# Patient Record
Sex: FEMALE | Race: WHITE | NOT HISPANIC OR LATINO | Employment: OTHER | URBAN - METROPOLITAN AREA
[De-identification: names, ages, dates, MRNs, and addresses within clinical notes are randomized per-mention and may not be internally consistent; named-entity substitution may affect disease eponyms.]

---

## 2017-08-30 ENCOUNTER — OFFICE VISIT (OUTPATIENT)
Dept: FAMILY MEDICINE CLINIC | Facility: CLINIC | Age: 22
End: 2017-08-30

## 2017-08-30 VITALS
TEMPERATURE: 98.7 F | HEIGHT: 64 IN | DIASTOLIC BLOOD PRESSURE: 62 MMHG | HEART RATE: 96 BPM | OXYGEN SATURATION: 98 % | RESPIRATION RATE: 16 BRPM | SYSTOLIC BLOOD PRESSURE: 100 MMHG | WEIGHT: 112.6 LBS | BODY MASS INDEX: 19.22 KG/M2

## 2017-08-30 DIAGNOSIS — Z76.89 ENCOUNTER TO ESTABLISH CARE WITH NEW DOCTOR: ICD-10-CM

## 2017-08-30 DIAGNOSIS — F90.0 ATTENTION DEFICIT HYPERACTIVITY DISORDER (ADHD), PREDOMINANTLY INATTENTIVE TYPE: Primary | ICD-10-CM

## 2017-08-30 PROBLEM — F98.8 ADD (ATTENTION DEFICIT DISORDER): Status: ACTIVE | Noted: 2017-08-30

## 2017-08-30 LAB
ALBUMIN SERPL-MCNC: 4.6 G/DL (ref 3.2–4.8)
ALBUMIN/GLOB SERPL: 2.1 G/DL (ref 1.5–2.5)
ALP SERPL-CCNC: 64 U/L (ref 25–100)
ALT SERPL-CCNC: 26 U/L (ref 7–40)
AST SERPL-CCNC: 20 U/L (ref 0–33)
BASOPHILS # BLD AUTO: 0.01 10*3/MM3 (ref 0–0.2)
BASOPHILS NFR BLD AUTO: 0.2 % (ref 0–1)
BILIRUB SERPL-MCNC: 0.5 MG/DL (ref 0.3–1.2)
BUN SERPL-MCNC: 11 MG/DL (ref 9–23)
BUN/CREAT SERPL: 13.8 (ref 7–25)
CALCIUM SERPL-MCNC: 9.6 MG/DL (ref 8.7–10.4)
CHLORIDE SERPL-SCNC: 106 MMOL/L (ref 99–109)
CO2 SERPL-SCNC: 28 MMOL/L (ref 20–31)
CREAT SERPL-MCNC: 0.8 MG/DL (ref 0.6–1.3)
EOSINOPHIL # BLD AUTO: 0.14 10*3/MM3 (ref 0–0.3)
EOSINOPHIL NFR BLD AUTO: 2.4 % (ref 0–3)
ERYTHROCYTE [DISTWIDTH] IN BLOOD BY AUTOMATED COUNT: 12.3 % (ref 11.3–14.5)
GLOBULIN SER CALC-MCNC: 2.2 GM/DL
GLUCOSE SERPL-MCNC: 86 MG/DL (ref 70–100)
HCT VFR BLD AUTO: 40.4 % (ref 34.5–44)
HGB BLD-MCNC: 13.3 G/DL (ref 11.5–15.5)
IMM GRANULOCYTES # BLD: 0 10*3/MM3 (ref 0–0.03)
IMM GRANULOCYTES NFR BLD: 0 % (ref 0–0.6)
LYMPHOCYTES # BLD AUTO: 1.65 10*3/MM3 (ref 0.6–4.8)
LYMPHOCYTES NFR BLD AUTO: 28.7 % (ref 24–44)
MCH RBC QN AUTO: 29.9 PG (ref 27–31)
MCHC RBC AUTO-ENTMCNC: 32.9 G/DL (ref 32–36)
MCV RBC AUTO: 90.8 FL (ref 80–99)
MONOCYTES # BLD AUTO: 0.43 10*3/MM3 (ref 0–1)
MONOCYTES NFR BLD AUTO: 7.5 % (ref 0–12)
NEUTROPHILS # BLD AUTO: 3.52 10*3/MM3 (ref 1.5–8.3)
NEUTROPHILS NFR BLD AUTO: 61.2 % (ref 41–71)
PLATELET # BLD AUTO: 234 10*3/MM3 (ref 150–450)
POTASSIUM SERPL-SCNC: 4.1 MMOL/L (ref 3.5–5.5)
PROT SERPL-MCNC: 6.8 G/DL (ref 5.7–8.2)
RBC # BLD AUTO: 4.45 10*6/MM3 (ref 3.89–5.14)
SODIUM SERPL-SCNC: 140 MMOL/L (ref 132–146)
WBC # BLD AUTO: 5.75 10*3/MM3 (ref 3.5–10.8)

## 2017-08-30 PROCEDURE — 99203 OFFICE O/P NEW LOW 30 MIN: CPT | Performed by: FAMILY MEDICINE

## 2017-08-30 NOTE — PROGRESS NOTES
Subjective   Amanda Curtis is a 22 y.o. female.     History of Present Illness   Here to establish care  LMP: 8/11/17  Pap: 2013  She recently moved back to [place] from [place].  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  She has a history of ADD, currently treated with Vyvanse 50mg.   States that she is well on this dosage, but if she misses it she will have a headache. Requesting refill of medication.   She has no adverse side effects related to the treatment.    The following portions of the patient's history were reviewed and updated as appropriate: allergies, current medications, past family history, past medical history, past social history, past surgical history and problem list.    Review of Systems   Constitutional: Negative for diaphoresis, fatigue and fever.   HENT: Negative for congestion, ear pain and hearing loss.    Eyes: Negative for pain and visual disturbance.   Respiratory: Negative for cough, chest tightness and shortness of breath.    Cardiovascular: Negative for chest pain, palpitations and leg swelling.   Gastrointestinal: Negative for abdominal pain, blood in stool, constipation, diarrhea and vomiting.   Endocrine: Negative for cold intolerance, heat intolerance, polydipsia, polyphagia and polyuria.   Genitourinary: Negative for difficulty urinating and dysuria.   Musculoskeletal: Negative for back pain and gait problem.   Skin: Negative for color change, rash and wound.   Allergic/Immunologic: Negative for environmental allergies and food allergies.   Neurological: Negative for dizziness, weakness, numbness and headaches.   Hematological: Negative for adenopathy. Does not bruise/bleed easily.   Psychiatric/Behavioral: Negative for agitation, confusion and sleep disturbance.       Objective   Physical Exam   Constitutional: She is oriented to person, place, and time. She appears well-developed and well-nourished.   HENT:   Head: Normocephalic and atraumatic.   Right Ear:  Hearing, tympanic membrane, external ear and ear canal normal.   Left Ear: Hearing, tympanic membrane, external ear and ear canal normal.   Nose: Nose normal.   Mouth/Throat: Uvula is midline, oropharynx is clear and moist and mucous membranes are normal.   Eyes: Conjunctivae and EOM are normal. Pupils are equal, round, and reactive to light.   Neck: Normal range of motion. Neck supple. No tracheal deviation present. No thyromegaly present.   Cardiovascular: Normal rate, regular rhythm and normal heart sounds.    No murmur heard.  Pulmonary/Chest: Effort normal and breath sounds normal. No respiratory distress. She has no wheezes.   Abdominal: Soft. Bowel sounds are normal. She exhibits no distension. There is no tenderness.   Musculoskeletal: Normal range of motion. She exhibits no edema, tenderness or deformity.   Lymphadenopathy:     She has no cervical adenopathy.   Neurological: She is alert and oriented to person, place, and time. No cranial nerve deficit.   Skin: Skin is warm and dry. No rash noted.   Psychiatric: She has a normal mood and affect. Her behavior is normal. Judgment and thought content normal.   Nursing note and vitals reviewed.      Assessment/Plan   Amanda was seen today for establish care and med refill.    Diagnoses and all orders for this visit:    Attention deficit hyperactivity disorder (ADHD), predominantly inattentive type  -     lisdexamfetamine (VYVANSE) 50 MG capsule; Take 1 capsule by mouth Every Morning.  -     CBC & Differential  -     Comprehensive Metabolic Panel    Encounter to establish care with new doctor  -     CBC & Differential  -     Comprehensive Metabolic Panel      Initially tachycardic, however on recheck it did normalize.  She has been advised to stop Vyvanse if there are any symptoms of palpitations or chest pain.  Labs completed today.   Vyvanse refilled. CAITLYN query complete. Treatment plan to include course of prescribed controlled substance. Risks including  addiction, benefits, and alternatives presented to patient.   She has been advised to schedule pap smear, she will contact gyn.

## 2017-08-30 NOTE — PATIENT INSTRUCTIONS
Go to the nearest ER or return to clinic if symptoms worsen, fever/chill develop  Attention Deficit Hyperactivity Disorder  Attention deficit hyperactivity disorder (ADHD) is a problem with behavior issues based on the way the brain functions (neurobehavioral disorder). It is a common reason for behavior and academic problems in school.  SYMPTOMS   There are 3 types of ADHD. The 3 types and some of the symptoms include:  · Inattentive.    Gets bored or distracted easily.    Loses or forgets things. Forgets to hand in homework.    Has trouble organizing or completing tasks.    Difficulty staying on task.    An inability to organize daily tasks and school work.    Leaving projects, chores, or homework unfinished.    Trouble paying attention or responding to details. Careless mistakes.    Difficulty following directions. Often seems like is not listening.    Dislikes activities that require sustained attention (like chores or homework).  · Hyperactive-impulsive.    Feels like it is impossible to sit still or stay in a seat. Fidgeting with hands and feet.    Trouble waiting turn.    Talking too much or out of turn. Interruptive.    Speaks or acts impulsively.    Aggressive, disruptive behavior.    Constantly busy or on the go; noisy.    Often leaves seat when they are expected to remain seated.    Often runs or climbs where it is not appropriate, or feels very restless.  · Combined.    Has symptoms of both of the above.  Often children with ADHD feel discouraged about themselves and with school. They often perform well below their abilities in school.  As children get older, the excess motor activities can calm down, but the problems with paying attention and staying organized persist. Most children do not outgrow ADHD but with good treatment can learn to cope with the symptoms.  DIAGNOSIS   When ADHD is suspected, the diagnosis should be made by professionals trained in ADHD. This professional will collect information  about the individual suspected of having ADHD. Information must be collected from various settings where the person lives, works, or attends school.    Diagnosis will include:  · Confirming symptoms began in childhood.  · Ruling out other reasons for the child's behavior.  · The health care providers will check with the child's school and check their medical records.  · They will talk to teachers and parents.  · Behavior rating scales for the child will be filled out by those dealing with the child on a daily basis.  A diagnosis is made only after all information has been considered.  TREATMENT   Treatment usually includes behavioral treatment, tutoring or extra support in school, and stimulant medicines. Because of the way a person's brain works with ADHD, these medicines decrease impulsivity and hyperactivity and increase attention. This is different than how they would work in a person who does not have ADHD. Other medicines used include antidepressants and certain blood pressure medicines.  Most experts agree that treatment for ADHD should address all aspects of the person's functioning. Along with medicines, treatment should include structured classroom management at school. Parents should reward good behavior, provide constant discipline, and set limits. Tutoring should be available for the child as needed.  ADHD is a lifelong condition. If untreated, the disorder can have long-term serious effects into adolescence and adulthood.  HOME CARE INSTRUCTIONS   · Often with ADHD there is a lot of frustration among family members dealing with the condition. Blame and anger are also feelings that are common. In many cases, because the problem affects the family as a whole, the entire family may need help. A therapist can help the family find better ways to handle the disruptive behaviors of the person with ADHD and promote change. If the person with ADHD is young, most of the therapist's work is with the parents.  Parents will learn techniques for coping with and improving their child's behavior. Sometimes only the child with the ADHD needs counseling. Your health care providers can help you make these decisions.  · Children with ADHD may need help learning how to organize. Some helpful tips include:  ¨ Keep routines the same every day from wake-up time to bedtime. Schedule all activities, including homework and playtime. Keep the schedule in a place where the person with ADHD will often see it. Johnny schedule changes as far in advance as possible.  ¨ Schedule outdoor and indoor recreation.  ¨ Have a place for everything and keep everything in its place. This includes clothing, backpacks, and school supplies.  ¨ Encourage writing down assignments and bringing home needed books. Work with your child's teachers for assistance in organizing school work.  · Offer your child a well-balanced diet. Breakfast that includes a balance of whole grains, protein, and fruits or vegetables is especially important for school performance. Children should avoid drinks with caffeine including:  ¨ Soft drinks.  ¨ Coffee.  ¨ Tea.  ¨ However, some older children (adolescents) may find these drinks helpful in improving their attention. Because it can also be common for adolescents with ADHD to become addicted to caffeine, talk with your health care provider about what is a safe amount of caffeine intake for your child.  · Children with ADHD need consistent rules that they can understand and follow. If rules are followed, give small rewards. Children with ADHD often receive, and expect, criticism. Look for good behavior and praise it. Set realistic goals. Give clear instructions. Look for activities that can foster success and self-esteem. Make time for pleasant activities with your child. Give lots of affection.  · Parents are their children's greatest advocates. Learn as much as possible about ADHD. This helps you become a stronger and better  advocate for your child. It also helps you educate your child's teachers and instructors if they feel inadequate in these areas. Parent support groups are often helpful. A national group with local chapters is called Children and Adults with Attention Deficit Hyperactivity Disorder (REBEL).  SEEK MEDICAL CARE IF:  · Your child has repeated muscle twitches, cough, or speech outbursts.  · Your child has sleep problems.  · Your child has a marked loss of appetite.  · Your child develops depression.  · Your child has new or worsening behavioral problems.  · Your child develops dizziness.  · Your child has a racing heart.  · Your child has stomach pains.  · Your child develops headaches.  SEEK IMMEDIATE MEDICAL CARE IF:  · Your child has been diagnosed with depression or anxiety and the symptoms seem to be getting worse.  · Your child has been depressed and suddenly appears to have increased energy or motivation.  · You are worried that your child is having a bad reaction to a medication he or she is taking for ADHD.     This information is not intended to replace advice given to you by your health care provider. Make sure you discuss any questions you have with your health care provider.     Document Released: 12/08/2003 Document Revised: 12/23/2014 Document Reviewed: 08/25/2014  [employer] Interactive Patient Education ©2017 [employer]

## 2017-09-25 ENCOUNTER — TELEPHONE (OUTPATIENT)
Dept: FAMILY MEDICINE CLINIC | Facility: CLINIC | Age: 22
End: 2017-09-25

## 2017-09-25 DIAGNOSIS — F90.0 ATTENTION DEFICIT HYPERACTIVITY DISORDER (ADHD), PREDOMINANTLY INATTENTIVE TYPE: ICD-10-CM

## 2017-09-25 NOTE — TELEPHONE ENCOUNTER
----- Message from Chaya Sauceda sent at 9/25/2017  3:47 PM EDT -----  Contact: Lalo  Patient is needing a refill Vyvanse 50mg. Please contact patient when ready [phone].

## 2017-10-24 ENCOUNTER — TELEPHONE (OUTPATIENT)
Dept: FAMILY MEDICINE CLINIC | Facility: CLINIC | Age: 22
End: 2017-10-24

## 2017-10-24 DIAGNOSIS — F90.0 ATTENTION DEFICIT HYPERACTIVITY DISORDER (ADHD), PREDOMINANTLY INATTENTIVE TYPE: ICD-10-CM

## 2017-11-17 ENCOUNTER — TELEPHONE (OUTPATIENT)
Dept: FAMILY MEDICINE CLINIC | Facility: CLINIC | Age: 22
End: 2017-11-17

## 2017-11-17 DIAGNOSIS — F90.0 ATTENTION DEFICIT HYPERACTIVITY DISORDER (ADHD), PREDOMINANTLY INATTENTIVE TYPE: ICD-10-CM

## 2017-11-20 ENCOUNTER — TELEPHONE (OUTPATIENT)
Dept: FAMILY MEDICINE CLINIC | Facility: CLINIC | Age: 22
End: 2017-11-20

## 2017-11-20 DIAGNOSIS — F90.0 ATTENTION DEFICIT HYPERACTIVITY DISORDER (ADHD), PREDOMINANTLY INATTENTIVE TYPE: ICD-10-CM

## 2017-11-20 NOTE — TELEPHONE ENCOUNTER
----- Message from Geovanna Lloyd sent at 11/20/2017  9:42 AM EST -----  Contact: KEVIN  PATIENT CALLING BACK ABOUT HER VYVANSE BECAUSE SHE NEEDS IT TODAY AND HER EMELIA IS NOT UNTIL THE 30TH OF NOV.     VYVANSE 5O MG

## 2017-11-20 NOTE — TELEPHONE ENCOUNTER
According to Jose Rafael report, she filled 10/26/17, so she shouldn't be out yet.   I will refill, but there will be a hold date on it. JONATHON

## 2017-11-30 ENCOUNTER — TELEPHONE (OUTPATIENT)
Dept: FAMILY MEDICINE CLINIC | Facility: CLINIC | Age: 22
End: 2017-11-30

## 2017-11-30 ENCOUNTER — OFFICE VISIT (OUTPATIENT)
Dept: FAMILY MEDICINE CLINIC | Facility: CLINIC | Age: 22
End: 2017-11-30

## 2017-11-30 VITALS
WEIGHT: 115.2 LBS | RESPIRATION RATE: 20 BRPM | TEMPERATURE: 97.8 F | DIASTOLIC BLOOD PRESSURE: 60 MMHG | HEART RATE: 80 BPM | SYSTOLIC BLOOD PRESSURE: 110 MMHG | HEIGHT: 64 IN | BODY MASS INDEX: 19.67 KG/M2

## 2017-11-30 DIAGNOSIS — F90.0 ATTENTION DEFICIT HYPERACTIVITY DISORDER (ADHD), PREDOMINANTLY INATTENTIVE TYPE: Primary | ICD-10-CM

## 2017-11-30 PROCEDURE — 99213 OFFICE O/P EST LOW 20 MIN: CPT | Performed by: FAMILY MEDICINE

## 2017-11-30 RX ORDER — POLYETHYLENE GLYCOL 3350 17 G/17G
17 POWDER, FOR SOLUTION ORAL DAILY
COMMUNITY

## 2017-11-30 NOTE — PATIENT INSTRUCTIONS
Go to the nearest ER or return to clinic if symptoms worsen, fever/chill develop    Attention Deficit Hyperactivity Disorder  Attention deficit hyperactivity disorder (ADHD) is a problem with behavior issues based on the way the brain functions (neurobehavioral disorder). It is a common reason for behavior and academic problems in school.  SYMPTOMS   There are 3 types of ADHD. The 3 types and some of the symptoms include:  · Inattentive.    Gets bored or distracted easily.    Loses or forgets things. Forgets to hand in homework.    Has trouble organizing or completing tasks.    Difficulty staying on task.    An inability to organize daily tasks and school work.    Leaving projects, chores, or homework unfinished.    Trouble paying attention or responding to details. Careless mistakes.    Difficulty following directions. Often seems like is not listening.    Dislikes activities that require sustained attention (like chores or homework).  · Hyperactive-impulsive.    Feels like it is impossible to sit still or stay in a seat. Fidgeting with hands and feet.    Trouble waiting turn.    Talking too much or out of turn. Interruptive.    Speaks or acts impulsively.    Aggressive, disruptive behavior.    Constantly busy or on the go; noisy.    Often leaves seat when they are expected to remain seated.    Often runs or climbs where it is not appropriate, or feels very restless.  · Combined.    Has symptoms of both of the above.  Often children with ADHD feel discouraged about themselves and with school. They often perform well below their abilities in school.  As children get older, the excess motor activities can calm down, but the problems with paying attention and staying organized persist. Most children do not outgrow ADHD but with good treatment can learn to cope with the symptoms.  DIAGNOSIS   When ADHD is suspected, the diagnosis should be made by professionals trained in ADHD. This professional will collect  information about the individual suspected of having ADHD. Information must be collected from various settings where the person lives, works, or attends school.    Diagnosis will include:  · Confirming symptoms began in childhood.  · Ruling out other reasons for the child's behavior.  · The health care providers will check with the child's school and check their medical records.  · They will talk to teachers and parents.  · Behavior rating scales for the child will be filled out by those dealing with the child on a daily basis.  A diagnosis is made only after all information has been considered.  TREATMENT   Treatment usually includes behavioral treatment, tutoring or extra support in school, and stimulant medicines. Because of the way a person's brain works with ADHD, these medicines decrease impulsivity and hyperactivity and increase attention. This is different than how they would work in a person who does not have ADHD. Other medicines used include antidepressants and certain blood pressure medicines.  Most experts agree that treatment for ADHD should address all aspects of the person's functioning. Along with medicines, treatment should include structured classroom management at school. Parents should reward good behavior, provide constant discipline, and set limits. Tutoring should be available for the child as needed.  ADHD is a lifelong condition. If untreated, the disorder can have long-term serious effects into adolescence and adulthood.  HOME CARE INSTRUCTIONS   · Often with ADHD there is a lot of frustration among family members dealing with the condition. Blame and anger are also feelings that are common. In many cases, because the problem affects the family as a whole, the entire family may need help. A therapist can help the family find better ways to handle the disruptive behaviors of the person with ADHD and promote change. If the person with ADHD is young, most of the therapist's work is with the  parents. Parents will learn techniques for coping with and improving their child's behavior. Sometimes only the child with the ADHD needs counseling. Your health care providers can help you make these decisions.  · Children with ADHD may need help learning how to organize. Some helpful tips include:  ¨ Keep routines the same every day from wake-up time to bedtime. Schedule all activities, including homework and playtime. Keep the schedule in a place where the person with ADHD will often see it. Johnny schedule changes as far in advance as possible.  ¨ Schedule outdoor and indoor recreation.  ¨ Have a place for everything and keep everything in its place. This includes clothing, backpacks, and school supplies.  ¨ Encourage writing down assignments and bringing home needed books. Work with your child's teachers for assistance in organizing school work.  · Offer your child a well-balanced diet. Breakfast that includes a balance of whole grains, protein, and fruits or vegetables is especially important for school performance. Children should avoid drinks with caffeine including:  ¨ Soft drinks.  ¨ Coffee.  ¨ Tea.  ¨ However, some older children (adolescents) may find these drinks helpful in improving their attention. Because it can also be common for adolescents with ADHD to become addicted to caffeine, talk with your health care provider about what is a safe amount of caffeine intake for your child.  · Children with ADHD need consistent rules that they can understand and follow. If rules are followed, give small rewards. Children with ADHD often receive, and expect, criticism. Look for good behavior and praise it. Set realistic goals. Give clear instructions. Look for activities that can foster success and self-esteem. Make time for pleasant activities with your child. Give lots of affection.  · Parents are their children's greatest advocates. Learn as much as possible about ADHD. This helps you become a stronger and  better advocate for your child. It also helps you educate your child's teachers and instructors if they feel inadequate in these areas. Parent support groups are often helpful. A national group with local chapters is called Children and Adults with Attention Deficit Hyperactivity Disorder (REBEL).  SEEK MEDICAL CARE IF:  · Your child has repeated muscle twitches, cough, or speech outbursts.  · Your child has sleep problems.  · Your child has a marked loss of appetite.  · Your child develops depression.  · Your child has new or worsening behavioral problems.  · Your child develops dizziness.  · Your child has a racing heart.  · Your child has stomach pains.  · Your child develops headaches.  SEEK IMMEDIATE MEDICAL CARE IF:  · Your child has been diagnosed with depression or anxiety and the symptoms seem to be getting worse.  · Your child has been depressed and suddenly appears to have increased energy or motivation.  · You are worried that your child is having a bad reaction to a medication he or she is taking for ADHD.     This information is not intended to replace advice given to you by your health care provider. Make sure you discuss any questions you have with your health care provider.     Document Released: 12/08/2003 Document Revised: 12/23/2014 Document Reviewed: 08/25/2014  [employer] Interactive Patient Education ©2017 [employer]

## 2017-11-30 NOTE — PROGRESS NOTES
"Subjective   Amanda Curtis is a 22 y.o. female.     ADD   This is a chronic problem. The current episode started more than 1 year ago. Pertinent negatives include no abdominal pain, chest pain, headaches or numbness. Nothing aggravates the symptoms. Treatments tried: Vyvanse. The treatment provided significant relief.   States that she is feeling a \"crash\" around 2 pm. She works long hours, 12-14 hours days. She takes Vyvanse in the early morning. She has previously been treated with Adderall, didn't like her personality while taking that medication.  Appetite is good, trying to eat better. She admits to eating carb heavy diet, but trying to increase protein and decrease carbs.  Denies any side effects related to Vyvanse, tolerating well.     The following portions of the patient's history were reviewed and updated as appropriate: allergies, current medications, past family history, past medical history, past social history, past surgical history and problem list.    Review of Systems   Constitutional: Negative.  Negative for appetite change.   Respiratory: Negative for chest tightness.    Cardiovascular: Negative for chest pain and palpitations.   Gastrointestinal: Negative for abdominal pain.   Neurological: Negative for dizziness, numbness and headaches.   Hematological: Does not bruise/bleed easily.   Psychiatric/Behavioral: Positive for decreased concentration. Negative for confusion, self-injury and sleep disturbance. The patient is not nervous/anxious and is not hyperactive.        Objective   Physical Exam   Constitutional: She is oriented to person, place, and time. She appears well-developed and well-nourished.   HENT:   Head: Normocephalic and atraumatic.   Right Ear: External ear normal.   Left Ear: External ear normal.   Nose: Nose normal.   Eyes: Conjunctivae and EOM are normal.   Cardiovascular: Normal rate, regular rhythm and normal heart sounds.    Pulmonary/Chest: Effort normal and breath sounds " normal.   Musculoskeletal: She exhibits no deformity.   Neurological: She is alert and oriented to person, place, and time. No cranial nerve deficit.   Skin: Skin is warm and dry.   Psychiatric: She has a normal mood and affect. Her behavior is normal. Thought content normal.   Nursing note and vitals reviewed.      Assessment/Plan   Amanda was seen today for add.    Diagnoses and all orders for this visit:    Attention deficit hyperactivity disorder (ADHD), predominantly inattentive type  -     Discontinue: lisdexamfetamine (VYVANSE) 60 MG capsule; Take 1 capsule by mouth Every Morning.  -     lisdexamfetamine (VYVANSE) 60 MG capsule; Take 1 capsule by mouth Every Morning.      Will increase the next dose of Vyvanse to 60mg daily and follow up shortly after. The crash she is also describing could be diet related. Advised her to consume complex carbohydrates and increased protein, avoid diet heavy in simple sugars.   CAITLYN query complete. Treatment plan to include course of prescribed controlled substance. Risks including addiction, benefits, and alternatives presented to patient.

## 2017-12-04 NOTE — TELEPHONE ENCOUNTER
An additional 10mg per day rx printed. Not sure if insurance will cover this prescription since she has already picked up 50mg daily. JONATHON

## 2018-01-11 ENCOUNTER — OFFICE VISIT (OUTPATIENT)
Dept: FAMILY MEDICINE CLINIC | Facility: CLINIC | Age: 23
End: 2018-01-11

## 2018-01-11 VITALS
WEIGHT: 112.5 LBS | RESPIRATION RATE: 18 BRPM | SYSTOLIC BLOOD PRESSURE: 112 MMHG | HEIGHT: 64 IN | TEMPERATURE: 98.3 F | BODY MASS INDEX: 19.21 KG/M2 | DIASTOLIC BLOOD PRESSURE: 70 MMHG | HEART RATE: 76 BPM

## 2018-01-11 DIAGNOSIS — F90.0 ATTENTION DEFICIT HYPERACTIVITY DISORDER (ADHD), PREDOMINANTLY INATTENTIVE TYPE: ICD-10-CM

## 2018-01-11 PROCEDURE — 99213 OFFICE O/P EST LOW 20 MIN: CPT | Performed by: FAMILY MEDICINE

## 2018-01-11 NOTE — PATIENT INSTRUCTIONS
Go to the nearest ER or return to clinic if symptoms worsen, fever/chill develop    Attention Deficit Hyperactivity Disorder  Attention deficit hyperactivity disorder (ADHD) is a problem with behavior issues based on the way the brain functions (neurobehavioral disorder). It is a common reason for behavior and academic problems in school.  SYMPTOMS   There are 3 types of ADHD. The 3 types and some of the symptoms include:  · Inattentive.    Gets bored or distracted easily.    Loses or forgets things. Forgets to hand in homework.    Has trouble organizing or completing tasks.    Difficulty staying on task.    An inability to organize daily tasks and school work.    Leaving projects, chores, or homework unfinished.    Trouble paying attention or responding to details. Careless mistakes.    Difficulty following directions. Often seems like is not listening.    Dislikes activities that require sustained attention (like chores or homework).  · Hyperactive-impulsive.    Feels like it is impossible to sit still or stay in a seat. Fidgeting with hands and feet.    Trouble waiting turn.    Talking too much or out of turn. Interruptive.    Speaks or acts impulsively.    Aggressive, disruptive behavior.    Constantly busy or on the go; noisy.    Often leaves seat when they are expected to remain seated.    Often runs or climbs where it is not appropriate, or feels very restless.  · Combined.    Has symptoms of both of the above.  Often children with ADHD feel discouraged about themselves and with school. They often perform well below their abilities in school.  As children get older, the excess motor activities can calm down, but the problems with paying attention and staying organized persist. Most children do not outgrow ADHD but with good treatment can learn to cope with the symptoms.  DIAGNOSIS   When ADHD is suspected, the diagnosis should be made by professionals trained in ADHD. This professional will collect  information about the individual suspected of having ADHD. Information must be collected from various settings where the person lives, works, or attends school.    Diagnosis will include:  · Confirming symptoms began in childhood.  · Ruling out other reasons for the child's behavior.  · The health care providers will check with the child's school and check their medical records.  · They will talk to teachers and parents.  · Behavior rating scales for the child will be filled out by those dealing with the child on a daily basis.  A diagnosis is made only after all information has been considered.  TREATMENT   Treatment usually includes behavioral treatment, tutoring or extra support in school, and stimulant medicines. Because of the way a person's brain works with ADHD, these medicines decrease impulsivity and hyperactivity and increase attention. This is different than how they would work in a person who does not have ADHD. Other medicines used include antidepressants and certain blood pressure medicines.  Most experts agree that treatment for ADHD should address all aspects of the person's functioning. Along with medicines, treatment should include structured classroom management at school. Parents should reward good behavior, provide constant discipline, and set limits. Tutoring should be available for the child as needed.  ADHD is a lifelong condition. If untreated, the disorder can have long-term serious effects into adolescence and adulthood.  HOME CARE INSTRUCTIONS   · Often with ADHD there is a lot of frustration among family members dealing with the condition. Blame and anger are also feelings that are common. In many cases, because the problem affects the family as a whole, the entire family may need help. A therapist can help the family find better ways to handle the disruptive behaviors of the person with ADHD and promote change. If the person with ADHD is young, most of the therapist's work is with the  parents. Parents will learn techniques for coping with and improving their child's behavior. Sometimes only the child with the ADHD needs counseling. Your health care providers can help you make these decisions.  · Children with ADHD may need help learning how to organize. Some helpful tips include:  ¨ Keep routines the same every day from wake-up time to bedtime. Schedule all activities, including homework and playtime. Keep the schedule in a place where the person with ADHD will often see it. Johnny schedule changes as far in advance as possible.  ¨ Schedule outdoor and indoor recreation.  ¨ Have a place for everything and keep everything in its place. This includes clothing, backpacks, and school supplies.  ¨ Encourage writing down assignments and bringing home needed books. Work with your child's teachers for assistance in organizing school work.  · Offer your child a well-balanced diet. Breakfast that includes a balance of whole grains, protein, and fruits or vegetables is especially important for school performance. Children should avoid drinks with caffeine including:  ¨ Soft drinks.  ¨ Coffee.  ¨ Tea.  ¨ However, some older children (adolescents) may find these drinks helpful in improving their attention. Because it can also be common for adolescents with ADHD to become addicted to caffeine, talk with your health care provider about what is a safe amount of caffeine intake for your child.  · Children with ADHD need consistent rules that they can understand and follow. If rules are followed, give small rewards. Children with ADHD often receive, and expect, criticism. Look for good behavior and praise it. Set realistic goals. Give clear instructions. Look for activities that can foster success and self-esteem. Make time for pleasant activities with your child. Give lots of affection.  · Parents are their children's greatest advocates. Learn as much as possible about ADHD. This helps you become a stronger and  better advocate for your child. It also helps you educate your child's teachers and instructors if they feel inadequate in these areas. Parent support groups are often helpful. A national group with local chapters is called Children and Adults with Attention Deficit Hyperactivity Disorder (REBEL).  SEEK MEDICAL CARE IF:  · Your child has repeated muscle twitches, cough, or speech outbursts.  · Your child has sleep problems.  · Your child has a marked loss of appetite.  · Your child develops depression.  · Your child has new or worsening behavioral problems.  · Your child develops dizziness.  · Your child has a racing heart.  · Your child has stomach pains.  · Your child develops headaches.  SEEK IMMEDIATE MEDICAL CARE IF:  · Your child has been diagnosed with depression or anxiety and the symptoms seem to be getting worse.  · Your child has been depressed and suddenly appears to have increased energy or motivation.  · You are worried that your child is having a bad reaction to a medication he or she is taking for ADHD.     This information is not intended to replace advice given to you by your health care provider. Make sure you discuss any questions you have with your health care provider.     Document Released: 12/08/2003 Document Revised: 12/23/2014 Document Reviewed: 08/25/2014  [employer] Interactive Patient Education ©2017 [employer]

## 2018-01-11 NOTE — PROGRESS NOTES
Subjective   Amanda Curtis is a 22 y.o. female.     History of Present Illness   Last visit Vyvanse dose was increased to 60mg daily due to losing effects throughout the day at a lower dose.   She has noticed improvement of ADHD without any side effects of increasing the dose. Denies chest pain, palpitations, insomnia, headaches. Appetite is good.     She reports that she has scheduled a routine pap smear.     The following portions of the patient's history were reviewed and updated as appropriate: allergies, current medications, past family history, past medical history, past social history, past surgical history and problem list.    Review of Systems   Constitutional: Negative.    Respiratory: Negative.    Cardiovascular: Negative.  Negative for palpitations.   Neurological: Negative for headaches.   Psychiatric/Behavioral: Negative for agitation, confusion, decreased concentration and sleep disturbance.       Objective   Physical Exam   Constitutional: She is oriented to person, place, and time. She appears well-developed and well-nourished.   HENT:   Head: Normocephalic and atraumatic.   Right Ear: External ear normal.   Left Ear: External ear normal.   Nose: Nose normal.   Eyes: Conjunctivae are normal.   Cardiovascular: Normal rate, regular rhythm and normal heart sounds.    No murmur heard.  Pulmonary/Chest: Effort normal and breath sounds normal.   Musculoskeletal: She exhibits no edema or deformity.   Neurological: She is alert and oriented to person, place, and time.   Skin: Skin is warm and dry.   Psychiatric: She has a normal mood and affect. Her behavior is normal. Judgment and thought content normal.   Nursing note and vitals reviewed.      Assessment/Plan   Amanda was seen today for adhd.    Diagnoses and all orders for this visit:    Body mass index (BMI) of 19.0-19.9 in adult    Attention deficit hyperactivity disorder (ADHD), predominantly inattentive type  -     lisdexamfetamine (VYVANSE) 60 MG  capsule; Take 1 capsule by mouth Every Morning      She is doing much better since the Vyvanse dosage increase to 60mg daily.   Rx given to patient today, she will hold until due for refill.   Follow up in 6 months

## 2018-02-20 ENCOUNTER — TELEPHONE (OUTPATIENT)
Dept: FAMILY MEDICINE CLINIC | Facility: CLINIC | Age: 23
End: 2018-02-20

## 2018-02-20 DIAGNOSIS — F90.0 ATTENTION DEFICIT HYPERACTIVITY DISORDER (ADHD), PREDOMINANTLY INATTENTIVE TYPE: ICD-10-CM

## 2018-02-20 NOTE — TELEPHONE ENCOUNTER
----- Message from Geovanna Lloyd sent at 2/20/2018  9:50 AM EST -----  Contact: KEVIN  PATIENT REQUESTING A REFILL:      VAVSUDHIR 60 MG

## 2018-03-20 ENCOUNTER — TELEPHONE (OUTPATIENT)
Dept: FAMILY MEDICINE CLINIC | Facility: CLINIC | Age: 23
End: 2018-03-20

## 2018-03-20 DIAGNOSIS — F90.0 ATTENTION DEFICIT HYPERACTIVITY DISORDER (ADHD), PREDOMINANTLY INATTENTIVE TYPE: ICD-10-CM

## 2018-03-20 NOTE — TELEPHONE ENCOUNTER
----- Message from Geovanna Lloyd sent at 3/20/2018 11:36 AM EDT -----  Contact: KEVIN  PATIENT REQUESTING A REFILL:    VYVANSE 60 MG

## 2018-04-16 ENCOUNTER — TELEPHONE (OUTPATIENT)
Dept: FAMILY MEDICINE CLINIC | Facility: CLINIC | Age: 23
End: 2018-04-16

## 2018-04-16 DIAGNOSIS — F90.0 ATTENTION DEFICIT HYPERACTIVITY DISORDER (ADHD), PREDOMINANTLY INATTENTIVE TYPE: ICD-10-CM

## 2018-04-16 NOTE — TELEPHONE ENCOUNTER
----- Message from Yana Junior sent at 4/16/2018  2:02 PM EDT -----  Contact: KEVIN ; MED REFILL   MED REFILL   lisdexamfetamine (VYVANSE) 60 MG capsule     IN OFFICE

## 2018-05-16 ENCOUNTER — TELEPHONE (OUTPATIENT)
Dept: FAMILY MEDICINE CLINIC | Facility: CLINIC | Age: 23
End: 2018-05-16

## 2018-05-16 DIAGNOSIS — F90.0 ATTENTION DEFICIT HYPERACTIVITY DISORDER (ADHD), PREDOMINANTLY INATTENTIVE TYPE: ICD-10-CM

## 2018-06-14 ENCOUNTER — TELEPHONE (OUTPATIENT)
Dept: FAMILY MEDICINE CLINIC | Facility: CLINIC | Age: 23
End: 2018-06-14

## 2018-06-14 DIAGNOSIS — F90.0 ATTENTION DEFICIT HYPERACTIVITY DISORDER (ADHD), PREDOMINANTLY INATTENTIVE TYPE: ICD-10-CM

## 2018-07-11 ENCOUNTER — OFFICE VISIT (OUTPATIENT)
Dept: FAMILY MEDICINE CLINIC | Facility: CLINIC | Age: 23
End: 2018-07-11

## 2018-07-11 VITALS
SYSTOLIC BLOOD PRESSURE: 102 MMHG | DIASTOLIC BLOOD PRESSURE: 62 MMHG | RESPIRATION RATE: 20 BRPM | TEMPERATURE: 98 F | WEIGHT: 104.8 LBS | HEIGHT: 64 IN | BODY MASS INDEX: 17.89 KG/M2 | HEART RATE: 100 BPM

## 2018-07-11 DIAGNOSIS — F90.0 ATTENTION DEFICIT HYPERACTIVITY DISORDER (ADHD), PREDOMINANTLY INATTENTIVE TYPE: Primary | ICD-10-CM

## 2018-07-11 PROCEDURE — 99213 OFFICE O/P EST LOW 20 MIN: CPT | Performed by: FAMILY MEDICINE

## 2018-07-11 NOTE — PATIENT INSTRUCTIONS
Go to the nearest ER or return to clinic if symptoms worsen, fever/chill develop      Attention Deficit Hyperactivity Disorder, Pediatric  Attention deficit hyperactivity disorder (ADHD) is a condition that can make it hard for a child to pay attention and concentrate or to control his or her behavior. The child may also have a lot of energy. ADHD is a disorder of the brain (neurodevelopmental disorder), and symptoms are typically first seen in early childhood. It is a common reason for behavioral and academic problems in school.  There are three main types of ADHD:  · Inattentive. With this type, children have difficulty paying attention.  · Hyperactive-impulsive. With this type, children have a lot of energy and have difficulty controlling their behavior.  · Combination. This type involves having symptoms of both of the other types.    ADHD is a lifelong condition. If it is not treated, the disorder can affect a child's future academic achievement, employment, and relationships.  What are the causes?  The exact cause of this condition is not known.  What increases the risk?  This condition is more likely to develop in:  · Children who have a first-degree relative, such as a parent or brother or sister, with the condition.  · Children who had a low birth weight.  · Children whose mothers had problems during pregnancy or used alcohol or tobacco during pregnancy.  · Children who have had a brain infection or a head injury.  · Children who have been exposed to lead.    What are the signs or symptoms?  Symptoms of this condition depend on the type of ADHD. Symptoms are listed here for each type:  Inattentive  · Problems with organization.  · Difficulty staying focused.  · Problems completing assignments at school.  · Often making simple mistakes.  · Problems sustaining mental effort.  · Not listening to instructions.  · Losing things often.  · Forgetting things often.  · Being easily  "distracted.  Hyperactive-impulsive  · Fidgeting often.  · Difficulty sitting still in one's seat.  · Talking a lot.  · Talking out of turn.  · Interrupting others.  · Difficulty relaxing or doing quiet activities.  · High energy levels and constant movement.  · Difficulty waiting.  · Always \"on the go.\"  Combination  · Having symptoms of both of the other types.  Children with ADHD may feel frustrated with themselves and may find school to be particularly discouraging. They often perform below their abilities in school.  As children get older, the excess movement can lessen, but the problems with paying attention and staying organized often continue. Most children do not outgrow ADHD, but with good treatment, they can learn to cope with the symptoms.  How is this diagnosed?  This condition is diagnosed based on a child's symptoms and academic history. The child's health care provider will do a complete assessment. As part of the assessment, the health care provider will ask the child questions and will ask the parents and teachers for their observations of the child. The health care provider looks for specific symptoms of ADHD.  Diagnosis will include:  · Ruling out other reasons for the child's behavior.  · Reviewing behavior rating scales that have been filled out about the child by people who deal with the child on a daily basis.    A diagnosis is made only after all information from multiple people has been considered.  How is this treated?  Treatment for this condition may include:  · Behavior therapy.  · Medicines to decrease impulsivity and hyperactivity and to increase attention. Behavior therapy is preferred for children younger than 6 years old. The combination of medicine and behavior therapy is most effective for children older than 6 years of age.  · Tutoring or extra support at school.  · Techniques for parents to use at home to help manage their child's symptoms and behavior.    Follow these " instructions at home:  Eating and drinking  · Offer your child a well-balanced diet. Breakfast that includes a balance of whole grains, protein, and fruits or vegetables is especially important for school performance.  · If your child has trouble with hyperactivity, have your child avoid drinks that contain caffeine. These include:  ? Soft drinks.  ? Coffee.  ? Tea.  · If your child is older and finds that caffeinated drinks help to improve his or her attention, talk with your child's health care provider about what amount of caffeine intake is a safe for your child.  Lifestyle    · Make sure your child gets a full night of sleep and regular daily exercise.  · Help manage your child's behavior by following the techniques learned in therapy. These may include:  ? Looking for good behavior and rewarding it.  ? Making rules for behavior that your child can understand and follow.  ? Giving clear instructions.  ? Responding consistently to your child's challenging behaviors.  ? Setting realistic goals.  ? Looking for activities that can lead to success and self-esteem.  ? Making time for pleasant activities with your child.  ? Giving lots of affection.  · Help your child learn to be organized. Some ways to do this include:  ? Keeping daily schedules the same. Have a regular wake-up time and bedtime for your child. Schedule all activities, including time for homework and time for play. Post the schedule in a place where your child will see it. Johnny schedule changes in advance.  ? Having a regular place for your child to store items such as clothing, backpacks, and school supplies.  ? Encouraging your child to write down school assignments and to bring home needed books. Work with your child's teachers for assistance in organizing school work.  General instructions  · Learn as much as you can about ADHD. This will improve your ability to help your child and to make sure he or she gets the support needed. It will also help  you educate your child's teachers and instructors if they do not feel that they have adequate knowledge or experience in these areas.  · Work with your child's teachers to make sure your child gets the support and extra help that is needed. This may include:  ? Tutoring.  ? Teacher cues to help your child remain on task.  ? Seating changes so your child is working at a desk that is free from distractions.  · Give over-the-counter and prescription medicines only as told by your child's health care provider.  · Keep all follow-up visits as told by your health care provider. This is important.  Contact a health care provider if:  · Your child has repeated muscle twitches (tics), coughs, or speech outbursts.  · Your child has sleep problems.  · Your child has a marked loss of appetite.  · Your child develops depression.  · Your child has new or worsening behavioral problems.  · Your child has dizziness.  · Your child has a racing heart.  · Your child has stomach pains.  · Your child develops headaches.  Get help right away if:  · Your child talks about or threatens suicide.  · You are worried that your child is having a bad reaction to a medicine that he or she is taking for ADHD.  This information is not intended to replace advice given to you by your health care provider. Make sure you discuss any questions you have with your health care provider.  Document Released: 12/08/2003 Document Revised: 08/16/2017 Document Reviewed: 07/13/2017  [employer] Interactive Patient Education © 2018 [employer] Inc.

## 2018-07-11 NOTE — PROGRESS NOTES
Subjective   Amanda Curtis is a 22 y.o. female.   Chief Complaint   Patient presents with   • ADD     6 mo f/u     History of Present Illness   ADHD is chronic condition, treated with Vyvanse.   She is tolerating treatment well, Vyvanse 60 mg daily is still doing good.   Denies chest pain, palpitations, insomnia, headaches. Appetite is good.     The following portions of the patient's history were reviewed and updated as appropriate: allergies, current medications, past family history, past medical history, past social history, past surgical history and problem list.    Review of Systems   Constitutional: Negative for chills, fatigue and fever.   Respiratory: Negative for cough, shortness of breath and wheezing.    Cardiovascular: Negative for chest pain, palpitations and leg swelling.   Gastrointestinal: Negative for abdominal pain.   Endocrine: Negative for cold intolerance and heat intolerance.   Musculoskeletal: Negative for gait problem.   Skin: Negative for rash.   Neurological: Negative for weakness, headache and confusion.   Hematological: Negative for adenopathy. Does not bruise/bleed easily.   Psychiatric/Behavioral: Negative for agitation, self-injury, suicidal ideas and depressed mood. The patient is not nervous/anxious.        Objective   Physical Exam   Constitutional: She is oriented to person, place, and time. She appears well-developed and well-nourished.   HENT:   Head: Normocephalic and atraumatic.   Right Ear: External ear normal.   Left Ear: External ear normal.   Nose: Nose normal.   Eyes: Conjunctivae are normal.   Neck: Normal range of motion. Neck supple.   Cardiovascular: Normal rate, regular rhythm and normal heart sounds.    No murmur heard.  Pulmonary/Chest: Effort normal and breath sounds normal.   Musculoskeletal: She exhibits no deformity.   Neurological: She is alert and oriented to person, place, and time.   Skin: Skin is warm and dry.   Psychiatric: Her behavior is normal.   Nursing  note and vitals reviewed.        Assessment/Plan   Amanda was seen today for add.    Diagnoses and all orders for this visit:    Attention deficit hyperactivity disorder (ADHD), predominantly inattentive type  -     lisdexamfetamine (VYVANSE) 60 MG capsule; Take 1 capsule by mouth Every Morning    ADHD is stable with current treatment, no changes  CAITLYN query unsuccessful secondary to prolonged retrieval time/inoperable CAITLYN system.

## 2018-07-13 ENCOUNTER — TELEPHONE (OUTPATIENT)
Dept: FAMILY MEDICINE CLINIC | Facility: CLINIC | Age: 23
End: 2018-07-13

## 2018-07-13 NOTE — TELEPHONE ENCOUNTER
----- Message from Yana Junior sent at 7/13/2018  9:07 AM EDT -----  Contact: KEVIN; MED REFILL   MED REFILL   lisdexamfetamine (VYVANSE) 60 MG capsule       PHARM ON FILE     CALL BACK   [phone]

## 2018-08-10 ENCOUNTER — TELEPHONE (OUTPATIENT)
Dept: FAMILY MEDICINE CLINIC | Facility: CLINIC | Age: 23
End: 2018-08-10

## 2018-08-10 DIAGNOSIS — F90.0 ATTENTION DEFICIT HYPERACTIVITY DISORDER (ADHD), PREDOMINANTLY INATTENTIVE TYPE: ICD-10-CM

## 2018-08-10 NOTE — TELEPHONE ENCOUNTER
----- Message from Geovanna Lloyd sent at 8/10/2018 11:21 AM EDT -----  Contact: KEVIN  PATIENT REQUESTING A REFILL:    VYVANSE 60 MG  1 A DAY    PHARMACY:  [facility]

## 2018-09-10 DIAGNOSIS — F90.0 ATTENTION DEFICIT HYPERACTIVITY DISORDER (ADHD), PREDOMINANTLY INATTENTIVE TYPE: ICD-10-CM

## 2018-09-10 NOTE — TELEPHONE ENCOUNTER
----- Message from Yana Junior sent at 9/10/2018  1:50 PM EDT -----  Contact: KEVIN; MED REFILL   MED REFILL     lisdexamfetamine (VYVANSE) 60 MG capsule Take 1 capsule by mouth Every Morning       PHARMACY -     [facility]  PT IS COMPLETELY OUT

## 2018-09-11 NOTE — TELEPHONE ENCOUNTER
LVM informing patient to  written script here at office. Left note on script that patient will need UDS when she arrives.

## 2018-10-10 ENCOUNTER — TELEPHONE (OUTPATIENT)
Dept: FAMILY MEDICINE CLINIC | Facility: CLINIC | Age: 23
End: 2018-10-10

## 2018-10-10 DIAGNOSIS — F90.0 ATTENTION DEFICIT HYPERACTIVITY DISORDER (ADHD), PREDOMINANTLY INATTENTIVE TYPE: ICD-10-CM

## 2018-11-08 ENCOUNTER — TELEPHONE (OUTPATIENT)
Dept: FAMILY MEDICINE CLINIC | Facility: CLINIC | Age: 23
End: 2018-11-08

## 2018-11-08 DIAGNOSIS — F90.0 ATTENTION DEFICIT HYPERACTIVITY DISORDER (ADHD), PREDOMINANTLY INATTENTIVE TYPE: ICD-10-CM

## 2018-11-08 NOTE — TELEPHONE ENCOUNTER
----- Message from Meredith Pisano sent at 11/8/2018  1:57 PM EST -----  Contact: moisés, mother  Refill Vyvanse 60mg taken 1x a day[facility] [phone] may leave a message

## 2018-12-07 ENCOUNTER — TELEPHONE (OUTPATIENT)
Dept: FAMILY MEDICINE CLINIC | Facility: CLINIC | Age: 23
End: 2018-12-07

## 2018-12-07 DIAGNOSIS — F90.0 ATTENTION DEFICIT HYPERACTIVITY DISORDER (ADHD), PREDOMINANTLY INATTENTIVE TYPE: ICD-10-CM

## 2019-01-03 ENCOUNTER — TELEPHONE (OUTPATIENT)
Dept: FAMILY MEDICINE CLINIC | Facility: CLINIC | Age: 24
End: 2019-01-03

## 2019-01-03 DIAGNOSIS — F90.0 ATTENTION DEFICIT HYPERACTIVITY DISORDER (ADHD), PREDOMINANTLY INATTENTIVE TYPE: ICD-10-CM

## 2019-01-03 NOTE — TELEPHONE ENCOUNTER
----- Message from Krista Alicia sent at 1/3/2019 10:29 AM EST -----  Contact: DR BROWN MED REFILL  MED REFILL ON lisdexamfetamine (VYVANSE) 60 MG capsule TO [facility].  [phone]

## 2019-01-09 ENCOUNTER — OFFICE VISIT (OUTPATIENT)
Dept: FAMILY MEDICINE CLINIC | Facility: CLINIC | Age: 24
End: 2019-01-09

## 2019-01-09 VITALS
BODY MASS INDEX: 17.84 KG/M2 | RESPIRATION RATE: 16 BRPM | DIASTOLIC BLOOD PRESSURE: 60 MMHG | HEART RATE: 96 BPM | HEIGHT: 64 IN | WEIGHT: 104.5 LBS | TEMPERATURE: 97.1 F | SYSTOLIC BLOOD PRESSURE: 108 MMHG

## 2019-01-09 DIAGNOSIS — F90.0 ATTENTION DEFICIT HYPERACTIVITY DISORDER (ADHD), PREDOMINANTLY INATTENTIVE TYPE: Primary | ICD-10-CM

## 2019-01-09 PROCEDURE — 99213 OFFICE O/P EST LOW 20 MIN: CPT | Performed by: FAMILY MEDICINE

## 2019-01-09 NOTE — PATIENT INSTRUCTIONS
Go to the nearest ER or return to clinic if symptoms worsen, fever/chill develop    Lisdexamfetamine Oral Capsule  What is this medicine?  LISDEXAMFETAMINE (lis DEX am fet a meen) is used to treat attention-deficit hyperactivity disorder (ADHD) in adults and children. It is also used to treat binge-eating disorder in adults. Federal law prohibits giving this medicine to any person other than the person for whom it was prescribed. Do not share this medicine with anyone else.  This medicine may be used for other purposes; ask your health care provider or pharmacist if you have questions.  COMMON BRAND NAME(S): Vyvanse  What should I tell my health care provider before I take this medicine?  They need to know if you have any of these conditions:  -anxiety or panic attacks  -circulation problems in fingers and toes  -glaucoma  -hardening or blockages of the arteries or heart blood vessels  -heart disease or a heart defect  -high blood pressure  -history of a drug or alcohol abuse problem  -history of stroke  -kidney disease  -liver disease  -mental illness  -seizures  -suicidal thoughts, plans, or attempt; a previous suicide attempt by you or a family member  -thyroid disease  -Tourette's syndrome  -an unusual or allergic reaction to lisdexamfetamine, other medicines, foods, dyes, or preservatives  -pregnant or trying to get pregnant  -breast-feeding  How should I use this medicine?  Take this medicine by mouth. Follow the directions on the prescription label. Swallow the capsules with a drink of water. You may open capsule and add to a glass of water, then drink right away. Take your doses at regular intervals. Do not take your medicine more often than directed. Do not suddenly stop your medicine. You must gradually reduce the dose or you may feel withdrawal effects. Ask your doctor or health care professional for advice.  A special MedGuide will be given to you by the pharmacist with each prescription and refill. Be  sure to read this information carefully each time.  Talk to your pediatrician regarding the use of this medicine in children. While this drug may be prescribed for children as young as 6 years of age for selected conditions, precautions do apply.  Overdosage: If you think you have taken too much of this medicine contact a poison control center or emergency room at once.  NOTE: This medicine is only for you. Do not share this medicine with others.  What if I miss a dose?  If you miss a dose, take it as soon as you can. If it is almost time for your next dose, take only that dose. Do not take double or extra doses.  What may interact with this medicine?  Do not take this medicine with any of the following medications:  -MAOIs like Carbex, Eldepryl, Marplan, Nardil, and Parnate  -other stimulant medicines for attention disorders, weight loss, or to stay awake  This medicine may also interact with the following medications:  -acetazolamide  -ammonium chloride  -antacids  -ascorbic acid  -atomoxetine  -caffeine  -certain medicines for blood pressure  -certain medicines for depression, anxiety, or psychotic disturbances  -certain medicines for seizures like carbamazepine, phenobarbital, phenytoin  -certain medicines for stomach problems like cimetidine, famotidine, omeprazole, lansoprazole  -cold or allergy medicines  -green tea  -levodopa  -linezolid  -medicines for sleep during surgery  -methenamine  -norepinephrine  -phenothiazines like chlorpromazine, mesoridazine, prochlorperazine, thioridazine  -propoxyphene  -sodium acid phosphate  -sodium bicarbonate  This list may not describe all possible interactions. Give your health care provider a list of all the medicines, herbs, non-prescription drugs, or dietary supplements you use. Also tell them if you smoke, drink alcohol, or use illegal drugs. Some items may interact with your medicine.  What should I watch for while using this medicine?  Visit your doctor for regular  check ups. This prescription requires that you follow special procedures with your doctor and pharmacy. You will need to have a new written prescription from your doctor every time you need a refill.  This medicine may affect your concentration, or hide signs of tiredness. Until you know how this medicine affects you, do not drive, ride a bicycle, use machinery, or do anything that needs mental alertness.  Tell your doctor or health care professional if this medicine loses its effects, or if you feel you need to take more than the prescribed amount. Do not change your dose without talking to your doctor or health care professional.  Decreased appetite is a common side effect when starting this medicine. Eating small, frequent meals or snacks can help. Talk to your doctor if you continue to have poor eating habits. Height and weight growth of a child taking this medicine will be monitored closely.  Do not take this medicine close to bedtime. It may prevent you from sleeping.  If you are going to need surgery, a MRI, CT scan, or other procedure, tell your doctor that you are taking this medicine. You may need to stop taking this medicine before the procedure.  Tell your doctor or healthcare professional right away if you notice unexplained wounds on your fingers and toes while taking this medicine. You should also tell your healthcare provider if you experience numbness or pain, changes in the skin color, or sensitivity to temperature in your fingers or toes.  What side effects may I notice from receiving this medicine?  Side effects that you should report to your doctor or health care professional as soon as possible:  -allergic reactions like skin rash, itching or hives, swelling of the face, lips, or tongue  -changes in vision  -chest pain or chest tightness  -confusion, trouble speaking or understanding  -fast, irregular heartbeat  -fingers or toes feel numb, cool, painful  -hallucination, loss of contact with  reality  -high blood pressure  -males: prolonged or painful erection  -seizures  -severe headaches  -shortness of breath  -suicidal thoughts or other mood changes  -trouble walking, dizziness, loss of balance or coordination  -uncontrollable head, mouth, neck, arm, or leg movements  Side effects that usually do not require medical attention (report to your doctor or health care professional if they continue or are bothersome):  -anxious  -headache  -loss of appetite  -nausea, vomiting  -trouble sleeping  -weight loss  This list may not describe all possible side effects. Call your doctor for medical advice about side effects. You may report side effects to FDA at 8-336-FDA-6470.  Where should I keep my medicine?  Keep out of the reach of children. This medicine can be abused. Keep your medicine in a safe place to protect it from theft. Do not share this medicine with anyone. Selling or giving away this medicine is dangerous and against the law.  Store at room temperature between 15 and 30 degrees C (59 and 86 degrees F). Protect from light. Keep container tightly closed. Throw away any unused medicine after the expiration date.  NOTE: This sheet is a summary. It may not cover all possible information. If you have questions about this medicine, talk to your doctor, pharmacist, or health care provider.  © 2018 Elsevier/Gold Standard (2015-10-21 19:20:14)

## 2019-01-09 NOTE — PROGRESS NOTES
Subjective   Amanda Curtis is a 23 y.o. female.   Chief Complaint   Patient presents with   • Follow-up   • ADD     History of Present Illness   ADHD  Chronic condition, treated with Vyvanse 60 mg daily.  Appetite is suppressed, but she does make herself eat throughout the day. Typically, eats a protein bar daily to help with this.   Denies palpitations, chest pain, sleep disturbance.    The following portions of the patient's history were reviewed and updated as appropriate: allergies, current medications, past family history, past medical history, past social history, past surgical history and problem list.    Review of Systems   Constitutional: Negative for activity change, fatigue and unexpected weight loss.   Respiratory: Negative for cough and shortness of breath.    Cardiovascular: Negative for chest pain and palpitations.   Gastrointestinal: Negative for abdominal pain.   Skin: Negative.    Neurological: Negative for headache and confusion.   Psychiatric/Behavioral: Negative for agitation, suicidal ideas and negative for hyperactivity. The patient is not nervous/anxious.        Objective   Physical Exam   Constitutional: She is oriented to person, place, and time. She appears well-developed and well-nourished.   HENT:   Head: Normocephalic and atraumatic.   Right Ear: External ear normal.   Left Ear: External ear normal.   Nose: Nose normal.   Eyes: Conjunctivae are normal.   Neck: Neck supple.   Cardiovascular: Normal rate, regular rhythm and normal heart sounds.   No murmur heard.  Pulmonary/Chest: Effort normal and breath sounds normal. She has no wheezes.   Lymphadenopathy:     She has no cervical adenopathy.   Neurological: She is alert and oriented to person, place, and time.   Skin: Skin is warm and dry.   Psychiatric: She has a normal mood and affect. Her behavior is normal.   Nursing note and vitals reviewed.        Assessment/Plan   Amanda was seen today for follow-up and add.    Diagnoses and all  orders for this visit:    Attention deficit hyperactivity disorder (ADHD), predominantly inattentive type      Condition is stable with current treatment, no changes.   UDS completed Sept 2018  CAITLYN query complete. Treatment plan to include course of prescribed controlled substance. Risks including addiction, benefits, and alternatives presented to patient.

## 2019-02-04 ENCOUNTER — TELEPHONE (OUTPATIENT)
Dept: FAMILY MEDICINE CLINIC | Facility: CLINIC | Age: 24
End: 2019-02-04

## 2019-02-04 DIAGNOSIS — F90.0 ATTENTION DEFICIT HYPERACTIVITY DISORDER (ADHD), PREDOMINANTLY INATTENTIVE TYPE: ICD-10-CM

## 2019-02-04 NOTE — TELEPHONE ENCOUNTER
----- Message from Chikis Mariee Rep sent at 2/4/2019 10:06 AM EST -----  Contact: PT MOTHER CARLOS BROWN  REFILL    lisdexamfetamine (VYVANSE) 60 MG capsule     [facility]    PT: [phone]

## 2019-03-06 ENCOUNTER — TELEPHONE (OUTPATIENT)
Dept: FAMILY MEDICINE CLINIC | Facility: CLINIC | Age: 24
End: 2019-03-06

## 2019-03-06 DIAGNOSIS — F90.0 ATTENTION DEFICIT HYPERACTIVITY DISORDER (ADHD), PREDOMINANTLY INATTENTIVE TYPE: ICD-10-CM

## 2019-03-06 NOTE — TELEPHONE ENCOUNTER
----- Message from Krista Alicia sent at 3/6/2019  3:04 PM EST -----  Contact: DR BROWN MED REFILL  MED REFILL ON VYVANSE 60MG TO [facility].  [phone]

## 2019-04-03 ENCOUNTER — TELEPHONE (OUTPATIENT)
Dept: FAMILY MEDICINE CLINIC | Facility: CLINIC | Age: 24
End: 2019-04-03

## 2019-04-03 DIAGNOSIS — F90.0 ATTENTION DEFICIT HYPERACTIVITY DISORDER (ADHD), PREDOMINANTLY INATTENTIVE TYPE: ICD-10-CM

## 2019-04-03 NOTE — TELEPHONE ENCOUNTER
----- Message from Chikis Mariee Rep sent at 4/3/2019 10:47 AM EDT -----  Contact: PT MOTHER MATHIEU; KEVIN  REFILL    lisdexamfetamine (VYVANSE) 60 MG capsule    [facility]    PT: [phone]

## 2019-05-01 ENCOUNTER — TELEPHONE (OUTPATIENT)
Dept: FAMILY MEDICINE CLINIC | Facility: CLINIC | Age: 24
End: 2019-05-01

## 2019-05-01 DIAGNOSIS — F90.0 ATTENTION DEFICIT HYPERACTIVITY DISORDER (ADHD), PREDOMINANTLY INATTENTIVE TYPE: ICD-10-CM

## 2019-05-01 NOTE — TELEPHONE ENCOUNTER
----- Message from Court Mccarthy sent at 5/1/2019  1:39 PM EDT -----  Contact: PT'S MOTHER.  DR BROWN    PT NEEDING REFILL ON VYVANSE    TO [facility]

## 2019-05-30 ENCOUNTER — TELEPHONE (OUTPATIENT)
Dept: FAMILY MEDICINE CLINIC | Facility: CLINIC | Age: 24
End: 2019-05-30

## 2019-05-30 DIAGNOSIS — F90.0 ATTENTION DEFICIT HYPERACTIVITY DISORDER (ADHD), PREDOMINANTLY INATTENTIVE TYPE: ICD-10-CM

## 2019-05-30 NOTE — TELEPHONE ENCOUNTER
----- Message from Krista Alicia sent at 5/30/2019  3:13 PM EDT -----  Contact: DR BROWN MED REFILL   MED REFILL ON lisdexamfetamine (VYVANSE) 60 MG capsule TO [facility].  [phone]

## 2019-06-26 ENCOUNTER — TELEPHONE (OUTPATIENT)
Dept: FAMILY MEDICINE CLINIC | Facility: CLINIC | Age: 24
End: 2019-06-26

## 2019-06-26 DIAGNOSIS — F90.0 ATTENTION DEFICIT HYPERACTIVITY DISORDER (ADHD), PREDOMINANTLY INATTENTIVE TYPE: ICD-10-CM

## 2019-06-26 NOTE — TELEPHONE ENCOUNTER
----- Message from Krista Alicia sent at 6/26/2019  3:43 PM EDT -----  Contact: DR BROWN  MED REFILL ON lisdexamfetamine (VYVANSE) 60 MG capsule TO [facility].  [phone]

## 2019-07-18 ENCOUNTER — OFFICE VISIT (OUTPATIENT)
Dept: FAMILY MEDICINE CLINIC | Facility: CLINIC | Age: 24
End: 2019-07-18

## 2019-07-18 VITALS
DIASTOLIC BLOOD PRESSURE: 72 MMHG | SYSTOLIC BLOOD PRESSURE: 112 MMHG | RESPIRATION RATE: 16 BRPM | HEART RATE: 88 BPM | TEMPERATURE: 97.6 F | WEIGHT: 103 LBS | BODY MASS INDEX: 17.96 KG/M2

## 2019-07-18 DIAGNOSIS — F90.0 ATTENTION DEFICIT HYPERACTIVITY DISORDER (ADHD), PREDOMINANTLY INATTENTIVE TYPE: Primary | ICD-10-CM

## 2019-07-18 DIAGNOSIS — Z51.81 ENCOUNTER FOR THERAPEUTIC DRUG MONITORING: ICD-10-CM

## 2019-07-18 DIAGNOSIS — Z23 NEED FOR TDAP VACCINATION: ICD-10-CM

## 2019-07-18 PROCEDURE — 90715 TDAP VACCINE 7 YRS/> IM: CPT | Performed by: FAMILY MEDICINE

## 2019-07-18 PROCEDURE — 99213 OFFICE O/P EST LOW 20 MIN: CPT | Performed by: FAMILY MEDICINE

## 2019-07-18 PROCEDURE — 90471 IMMUNIZATION ADMIN: CPT | Performed by: FAMILY MEDICINE

## 2019-07-18 NOTE — PROGRESS NOTES
Subjective   Amanda Curtis is a 23 y.o. female.   Chief Complaint   Patient presents with   • Follow-up     History of Present Illness   ADHD  Chronic condition, treated with Vyvanse 60 mg daily. No negative side effect with medication.  Appetite is suppressed, but she does make herself eat throughout the day. Typically, eats a protein bar daily to help with this.   Denies palpitations, chest pain, sleep disturbance.    The following portions of the patient's history were reviewed and updated as appropriate: allergies, current medications, past family history, past medical history, past social history, past surgical history and problem list.    Review of Systems   Constitutional: Negative for activity change, fatigue and unexpected weight loss.   Respiratory: Negative for cough and shortness of breath.    Cardiovascular: Negative for chest pain and palpitations.   Gastrointestinal: Negative for abdominal pain.   Skin: Negative.    Neurological: Negative for headache and confusion.   Psychiatric/Behavioral: Negative for agitation, suicidal ideas and negative for hyperactivity. The patient is not nervous/anxious.        Objective   Physical Exam   Constitutional: She is oriented to person, place, and time. She appears well-developed and well-nourished.   HENT:   Head: Normocephalic and atraumatic.   Right Ear: External ear normal.   Left Ear: External ear normal.   Nose: Nose normal.   Eyes: Conjunctivae are normal.   Cardiovascular: Normal rate, regular rhythm and normal heart sounds.   No murmur heard.  Pulmonary/Chest: Effort normal and breath sounds normal.   Neurological: She is alert and oriented to person, place, and time.   Skin: Skin is warm.   Psychiatric: She has a normal mood and affect. Her behavior is normal.   Nursing note and vitals reviewed.        Assessment/Plan   Amanda was seen today for follow-up.    Diagnoses and all orders for this visit:    Attention deficit hyperactivity disorder (ADHD),  predominantly inattentive type  -     Pain Management Profile (13 Drugs) Urine - Urine, Clean Catch    Need for Tdap vaccination  -     Tdap Vaccine Greater Than or Equal To 6yo IM    Encounter for therapeutic drug monitoring  -     Pain Management Profile (13 Drugs) Urine - Urine, Clean Catch      Condition is stable with current treatment, no changes.   UDS updated today.   CAITLYN query complete. Treatment plan to include course of prescribed controlled substance. Risks including addiction, benefits, and alternatives presented to patient.

## 2019-07-18 NOTE — PATIENT INSTRUCTIONS
Go to the nearest ER or return to clinic if symptoms worsen, fever/chill develop    Attention Deficit Hyperactivity Disorder, Adult  Attention deficit hyperactivity disorder (ADHD) is a mental health disorder that starts during childhood. For many people with ADHD, the disorder continues into adult years. There are many things that you and your health care provider or therapist (mental health professional) can do to manage your symptoms.  What are the causes?  The exact cause of ADHD is not known.  What increases the risk?  You are more likely to develop this condition if:  · You have a family history of ADHD.  · You are male.  · You were born to a mother who smoked or drank alcohol during pregnancy.  · You were exposed to lead poisoning or other toxins in the womb or in early life.  · You were born before 37 weeks of pregnancy (prematurely) or you had a low birth weight.  · You have experienced a brain injury.    What are the signs or symptoms?  Symptoms of this condition depend on the type of ADHD. The two main types are inattentive and hyperactive-impulsive. Some people may have symptoms of both types.  Symptoms of the inattentive type include:  · Difficulty watching, listening, or thinking with focused effort (paying attention).  · Making careless mistakes.  · Not listening.  · Not following instructions.  · Being disorganized.  · Avoiding tasks that require time and attention.  · Losing things.  · Forgetting things.  · Being easily distracted.    Symptoms of the hyperactive-impulsive type include:  · Restlessness.  · Talking too much.  · Interrupting.  · Difficulty with:  ? Sitting still.  ? Staying quiet.  ? Feeling motivated.  ? Relaxing.  ? Waiting in line or waiting for a turn.    How is this diagnosed?  This condition is diagnosed based on your current symptoms and your history of symptoms. The diagnosis can be made by a provider such as a primary care provider, psychiatrist, psychologist, or clinical  . The provider may use a symptom checklist or a standardized behavior rating scale to evaluate your symptoms. He or she may want to talk with family members who have known you for a long time and have observed your behaviors.  There are no lab tests or brain imaging tests that can diagnose ADHD.  How is this treated?  This condition can be treated with medicines and behavior therapy. Medicines may be the best option to reduce impulsive behaviors and improve attention. Your health care provider may recommend:  · Stimulant medicines. These are the most common medicines used for adult ADHD. They affect certain chemicals in the brain (neurotransmitters). These medicines may be long-acting or short-acting. This will determine how often you need to take the medicine.  · A non-stimulant medicine for adult ADHD (atomoxetine). This medicine increases a neurotransmitter called norepinephrine. It may take weeks to months to see effects from this medicine.    Psychotherapy and behavioral management are also important for treating ADHD. Psychotherapy is often used along with medicine. Your health care provider may suggest:  · Cognitive behavioral therapy (CBT). This type of therapy teaches you to replace negative thoughts and actions with positive thoughts and actions. When used as part of ADHD treatment, this therapy may also include:  ? Coping strategies for organization, time management, impulse control, and stress reduction.  ? Mindfulness and meditation training.  · Behavioral management. This may include strategies for organization and time management. You may work with an ADHD  who is specially trained to help people with ADHD to manage and organize activities and to function more effectively.    Follow these instructions at home:  Medicines  · Take over-the-counter and prescription medicines only as told by your health care provider.  · Talk with your health care provider about the possible side effects  of your medicine to watch for.  General instructions  · Learn as much as you can about adult ADHD, and work closely with your health care providers to find the treatments that work best for you.  · Do not use drugs or abuse alcohol. Limit alcohol intake to no more than 1 drink a day for nonpregnant women and 2 drinks a day for men. One drink equals 12 oz of beer, 5 oz of wine, or 1½ oz of hard liquor.  · Follow the same schedule each day. Make sure your schedule includes enough time for you to get plenty of sleep.  · Use reminder devices like notes, calendars, and phone apps to stay on-time and organized.  · Eat a healthy diet. Do not skip meals.  · Exercise regularly. Exercise can help to reduce stress and anxiety.  · Keep all follow-up visits as told by your health care provider and therapist. This is important.  Where to find more information  · A health care provider may be able to recommend resources that are available online or over the phone. You could start with:  ? Attention Deficit Disorder Association (ADDA): www.add.org  ? National [place] of [place] (NIMH): www.nimh.nih.gov  Contact a health care provider if:  · Your symptoms are changing, getting worse, or not improving.  · You have side effects from your medicine, such as:  ? Repeated muscle twitches, coughing, or speech outbursts.  ? Sleep problems.  ? Loss of appetite.  ? Depression.  ? New or worsening behavior problems.  ? Dizziness.  ? Unusually fast heartbeat.  ? Stomach pains.  ? Headaches.  · You are struggling with anxiety, depression, or substance abuse.  Get help right away if:  · You have a severe reaction to a medicine.  · You have thoughts of hurting yourself or others.  If you ever feel like you may hurt yourself or others, or have thoughts about taking your own life, get help right away. You can go to the nearest emergency department or call:  · Your local emergency services (911 in the [place]  · A suicide crisis helpline, such  as the National Suicide Prevention Lifeline at 1-395.130.1165. This is open 24 hours a day.    Summary  · ADHD is a mental health disorder that starts during childhood and often continues into adult years.  · The exact cause of ADHD is not known.  · There is no cure for ADHD, but treatment with medicine, therapy, or behavioral training can help you manage your condition.  This information is not intended to replace advice given to you by your health care provider. Make sure you discuss any questions you have with your health care provider.  Document Released: 08/09/2018 Document Revised: 08/09/2018 Document Reviewed: 08/09/2018  [employer] Interactive Patient Education © 2019 [employer]vier Inc.

## 2019-07-26 ENCOUNTER — TELEPHONE (OUTPATIENT)
Dept: FAMILY MEDICINE CLINIC | Facility: CLINIC | Age: 24
End: 2019-07-26

## 2019-07-26 DIAGNOSIS — F90.0 ATTENTION DEFICIT HYPERACTIVITY DISORDER (ADHD), PREDOMINANTLY INATTENTIVE TYPE: ICD-10-CM

## 2019-07-26 NOTE — TELEPHONE ENCOUNTER
----- Message from Chikis Mariee Rep sent at 7/26/2019  9:33 AM EDT -----  Contact: PT MOTHER NABILCintia CR    lisdexamfetamine (VYVANSE) 60 MG capsule     [facility]    PT: [phone]

## 2019-08-27 ENCOUNTER — TELEPHONE (OUTPATIENT)
Dept: FAMILY MEDICINE CLINIC | Facility: CLINIC | Age: 24
End: 2019-08-27

## 2019-08-27 DIAGNOSIS — F90.0 ATTENTION DEFICIT HYPERACTIVITY DISORDER (ADHD), PREDOMINANTLY INATTENTIVE TYPE: ICD-10-CM

## 2019-08-27 NOTE — TELEPHONE ENCOUNTER
----- Message from Chikis Mariee sent at 8/27/2019 11:40 AM EDT -----  Contact: PT MOM CARLOS BROWN  REFILL    lisdexamfetamine (VYVANSE) 60 MG capsule     [facility]    PT MOM: [id number]

## 2019-09-24 DIAGNOSIS — F90.0 ATTENTION DEFICIT HYPERACTIVITY DISORDER (ADHD), PREDOMINANTLY INATTENTIVE TYPE: ICD-10-CM

## 2019-09-27 ENCOUNTER — TELEPHONE (OUTPATIENT)
Dept: FAMILY MEDICINE CLINIC | Facility: CLINIC | Age: 24
End: 2019-09-27

## 2019-09-27 DIAGNOSIS — F90.0 ATTENTION DEFICIT HYPERACTIVITY DISORDER (ADHD), PREDOMINANTLY INATTENTIVE TYPE: ICD-10-CM

## 2019-09-27 NOTE — TELEPHONE ENCOUNTER
MOTHER IS CALLING TO STATE [facility] VYVANCE 60 MG BECAUSE IT IS A CONTROLLED SUBTANCE. CAN IT BE RESENT TO [facility] ON [address] IN [place]  .THANK YOU

## 2019-10-24 DIAGNOSIS — F90.0 ATTENTION DEFICIT HYPERACTIVITY DISORDER (ADHD), PREDOMINANTLY INATTENTIVE TYPE: ICD-10-CM

## 2019-11-26 DIAGNOSIS — F90.0 ATTENTION DEFICIT HYPERACTIVITY DISORDER (ADHD), PREDOMINANTLY INATTENTIVE TYPE: ICD-10-CM

## 2019-12-26 ENCOUNTER — TELEPHONE (OUTPATIENT)
Dept: FAMILY MEDICINE CLINIC | Facility: CLINIC | Age: 24
End: 2019-12-26

## 2019-12-26 DIAGNOSIS — F90.0 ATTENTION DEFICIT HYPERACTIVITY DISORDER (ADHD), PREDOMINANTLY INATTENTIVE TYPE: ICD-10-CM

## 2019-12-26 NOTE — TELEPHONE ENCOUNTER
PT MOTHER CALLED IN REQUESTING REFILL FOR  lisdexamfetamine (VYVANSE) 60 MG capsule SHE STATED SHE WILL NEED BY TOMORROW MORNING    CB NUMBER: [phone]  [facility]: [facility]  FAX# [phone]

## 2020-01-22 ENCOUNTER — OFFICE VISIT (OUTPATIENT)
Dept: FAMILY MEDICINE CLINIC | Facility: CLINIC | Age: 25
End: 2020-01-22

## 2020-01-22 VITALS
TEMPERATURE: 98.4 F | HEIGHT: 64 IN | OXYGEN SATURATION: 98 % | HEART RATE: 87 BPM | BODY MASS INDEX: 17.69 KG/M2 | WEIGHT: 103.6 LBS | RESPIRATION RATE: 16 BRPM | SYSTOLIC BLOOD PRESSURE: 108 MMHG | DIASTOLIC BLOOD PRESSURE: 70 MMHG

## 2020-01-22 DIAGNOSIS — F90.0 ATTENTION DEFICIT HYPERACTIVITY DISORDER (ADHD), PREDOMINANTLY INATTENTIVE TYPE: Primary | ICD-10-CM

## 2020-01-22 PROCEDURE — 99213 OFFICE O/P EST LOW 20 MIN: CPT | Performed by: FAMILY MEDICINE

## 2020-01-22 NOTE — PROGRESS NOTES
Subjective   Amanda Curtis is a 24 y.o. female.   Chief Complaint   Patient presents with   • ADD     med refill     History of Present Illness   ADHD  Chronic condition, she is currently taking Vyvanse 60 mg daily. Concentration is improved with treatment.  No negative side effect associated with medication.  Appetite is doing well, she has maintained her weight since last visit. She does eat a protein bar to help with caloric intake.  Denies palpitations, chest pain, sleep disturbance.    The following portions of the patient's history were reviewed and updated as appropriate: allergies, current medications, past family history, past medical history, past social history, past surgical history and problem list.    Review of Systems   Constitutional: Negative for activity change, fatigue and unexpected weight loss.   Respiratory: Negative for cough and shortness of breath.    Cardiovascular: Negative for chest pain and palpitations.   Gastrointestinal: Negative for abdominal pain.   Skin: Negative.    Neurological: Negative for headache and confusion.   Psychiatric/Behavioral: Negative for agitation, suicidal ideas and negative for hyperactivity. The patient is not nervous/anxious.        Objective   Physical Exam   Constitutional: She is oriented to person, place, and time. She appears well-developed and well-nourished.   HENT:   Head: Normocephalic and atraumatic.   Right Ear: External ear normal.   Left Ear: External ear normal.   Nose: Nose normal.   Eyes: Conjunctivae are normal.   Neck: Normal range of motion. Neck supple.   Cardiovascular: Normal rate, regular rhythm and normal heart sounds.   No murmur heard.  Pulmonary/Chest: Effort normal and breath sounds normal. She has no wheezes.   Musculoskeletal: She exhibits no edema or deformity.   Neurological: She is alert and oriented to person, place, and time.   Skin: Skin is warm and dry.   Psychiatric: She has a normal mood and affect. Her behavior is normal.    Nursing note and vitals reviewed.        Assessment/Plan   Amanda was seen today for add.    Diagnoses and all orders for this visit:    Attention deficit hyperactivity disorder (ADHD), predominantly inattentive type  -     lisdexamfetamine (VYVANSE) 60 MG capsule; Take 1 capsule by mouth Every Morning      ADHD stable with Vyvanse, no changes.   UDS complete July 2019. CAITLYN query complete. Treatment plan to include course of prescribed controlled substance. Risks including addiction, benefits, and alternatives presented to patient.

## 2020-01-22 NOTE — PATIENT INSTRUCTIONS
Go to the nearest ER or return to clinic if symptoms worsen, fever/chill develop    Lisdexamfetamine Oral Capsule  What is this medicine?  LISDEXAMFETAMINE (lis DEX am fet a meen) is used to treat attention-deficit hyperactivity disorder (ADHD) in adults and children. It is also used to treat binge-eating disorder in adults. Federal law prohibits giving this medicine to any person other than the person for whom it was prescribed. Do not share this medicine with anyone else.  This medicine may be used for other purposes; ask your health care provider or pharmacist if you have questions.  COMMON BRAND NAME(S): Vyvanse  What should I tell my health care provider before I take this medicine?  They need to know if you have any of these conditions:  -anxiety or panic attacks  -circulation problems in fingers and toes  -glaucoma  -hardening or blockages of the arteries or heart blood vessels  -heart disease or a heart defect  -high blood pressure  -history of a drug or alcohol abuse problem  -history of stroke  -kidney disease  -liver disease  -mental illness  -seizures  -suicidal thoughts, plans, or attempt; a previous suicide attempt by you or a family member  -thyroid disease  -Tourette's syndrome  -an unusual or allergic reaction to lisdexamfetamine, other medicines, foods, dyes, or preservatives  -pregnant or trying to get pregnant  -breast-feeding  How should I use this medicine?  Take this medicine by mouth. Follow the directions on the prescription label. Swallow the capsules with a drink of water. You may open capsule and add to a glass of water, then drink right away. Take your doses at regular intervals. Do not take your medicine more often than directed. Do not suddenly stop your medicine. You must gradually reduce the dose or you may feel withdrawal effects. Ask your doctor or health care professional for advice.  A special MedGuide will be given to you by the pharmacist with each prescription and refill. Be  sure to read this information carefully each time.  Talk to your pediatrician regarding the use of this medicine in children. While this drug may be prescribed for children as young as 6 years of age for selected conditions, precautions do apply.  Overdosage: If you think you have taken too much of this medicine contact a poison control center or emergency room at once.  NOTE: This medicine is only for you. Do not share this medicine with others.  What if I miss a dose?  If you miss a dose, take it as soon as you can. If it is almost time for your next dose, take only that dose. Do not take double or extra doses.  What may interact with this medicine?  Do not take this medicine with any of the following medications:  -MAOIs like Carbex, Eldepryl, Marplan, Nardil, and Parnate  -other stimulant medicines for attention disorders, weight loss, or to stay awake  This medicine may also interact with the following medications:  -acetazolamide  -ammonium chloride  -antacids  -ascorbic acid  -atomoxetine  -caffeine  -certain medicines for blood pressure  -certain medicines for depression, anxiety, or psychotic disturbances  -certain medicines for seizures like carbamazepine, phenobarbital, phenytoin  -certain medicines for stomach problems like cimetidine, famotidine, omeprazole, lansoprazole  -cold or allergy medicines  -green tea  -levodopa  -linezolid  -medicines for sleep during surgery  -methenamine  -norepinephrine  -phenothiazines like chlorpromazine, mesoridazine, prochlorperazine, thioridazine  -propoxyphene  -sodium acid phosphate  -sodium bicarbonate  This list may not describe all possible interactions. Give your health care provider a list of all the medicines, herbs, non-prescription drugs, or dietary supplements you use. Also tell them if you smoke, drink alcohol, or use illegal drugs. Some items may interact with your medicine.  What should I watch for while using this medicine?  Visit your doctor for regular  check ups. This prescription requires that you follow special procedures with your doctor and pharmacy. You will need to have a new written prescription from your doctor every time you need a refill.  This medicine may affect your concentration, or hide signs of tiredness. Until you know how this medicine affects you, do not drive, ride a bicycle, use machinery, or do anything that needs mental alertness.  Tell your doctor or health care professional if this medicine loses its effects, or if you feel you need to take more than the prescribed amount. Do not change your dose without talking to your doctor or health care professional.  Decreased appetite is a common side effect when starting this medicine. Eating small, frequent meals or snacks can help. Talk to your doctor if you continue to have poor eating habits. Height and weight growth of a child taking this medicine will be monitored closely.  Do not take this medicine close to bedtime. It may prevent you from sleeping.  If you are going to need surgery, a MRI, CT scan, or other procedure, tell your doctor that you are taking this medicine. You may need to stop taking this medicine before the procedure.  Tell your doctor or healthcare professional right away if you notice unexplained wounds on your fingers and toes while taking this medicine. You should also tell your healthcare provider if you experience numbness or pain, changes in the skin color, or sensitivity to temperature in your fingers or toes.  What side effects may I notice from receiving this medicine?  Side effects that you should report to your doctor or health care professional as soon as possible:  -allergic reactions like skin rash, itching or hives, swelling of the face, lips, or tongue  -changes in vision  -chest pain or chest tightness  -confusion, trouble speaking or understanding  -fast, irregular heartbeat  -fingers or toes feel numb, cool, painful  -hallucination, loss of contact with  reality  -high blood pressure  -males: prolonged or painful erection  -seizures  -severe headaches  -shortness of breath  -suicidal thoughts or other mood changes  -trouble walking, dizziness, loss of balance or coordination  -uncontrollable head, mouth, neck, arm, or leg movements  Side effects that usually do not require medical attention (report to your doctor or health care professional if they continue or are bothersome):  -anxious  -headache  -loss of appetite  -nausea, vomiting  -trouble sleeping  -weight loss  This list may not describe all possible side effects. Call your doctor for medical advice about side effects. You may report side effects to FDA at 8-341-FDA-3468.  Where should I keep my medicine?  Keep out of the reach of children. This medicine can be abused. Keep your medicine in a safe place to protect it from theft. Do not share this medicine with anyone. Selling or giving away this medicine is dangerous and against the law.  Store at room temperature between 15 and 30 degrees C (59 and 86 degrees F). Protect from light. Keep container tightly closed. Throw away any unused medicine after the expiration date.  NOTE: This sheet is a summary. It may not cover all possible information. If you have questions about this medicine, talk to your doctor, pharmacist, or health care provider.  © 2019 [employer] (2015-10-21 19:20:14)

## 2020-01-23 ENCOUNTER — TELEPHONE (OUTPATIENT)
Dept: FAMILY MEDICINE CLINIC | Facility: CLINIC | Age: 25
End: 2020-01-23

## 2020-01-23 DIAGNOSIS — F90.0 ATTENTION DEFICIT HYPERACTIVITY DISORDER (ADHD), PREDOMINANTLY INATTENTIVE TYPE: ICD-10-CM

## 2020-01-23 NOTE — TELEPHONE ENCOUNTER
Patients mom called in to have the patients lisdexamfetamine (VYVANSE) 60 MG capsule re-filled.    Please send to [facility] [address], confirmed    Mom's call back [phone]

## 2020-01-23 NOTE — TELEPHONE ENCOUNTER
Called and left voicemail for patient's mother to return call, office number given.   Called and cancelled prescription at [facility].

## 2020-02-24 ENCOUNTER — TELEPHONE (OUTPATIENT)
Dept: FAMILY MEDICINE CLINIC | Facility: CLINIC | Age: 25
End: 2020-02-24

## 2020-02-24 DIAGNOSIS — F90.0 ATTENTION DEFICIT HYPERACTIVITY DISORDER (ADHD), PREDOMINANTLY INATTENTIVE TYPE: ICD-10-CM

## 2020-02-24 NOTE — TELEPHONE ENCOUNTER
PATIENTS MOTHER CALLED IN TO REQUEST A MED REFILL    lisdexamfetamine (VYVANSE) 60 MG capsule     PATIENT IS OUT .  [facility].   PLEASE ADVISE [phone]

## 2020-03-24 DIAGNOSIS — F90.0 ATTENTION DEFICIT HYPERACTIVITY DISORDER (ADHD), PREDOMINANTLY INATTENTIVE TYPE: ICD-10-CM

## 2020-03-24 NOTE — TELEPHONE ENCOUNTER
Patient is calling requesting a refill on her VYVANSE please advise    Confirmed pharmacy  [facility]/ [address]

## 2020-03-25 NOTE — TELEPHONE ENCOUNTER
PT CALLED AND  WANTED TO KNOW STATUS OF HER REFILL ON VYVANSE; ADVISED IT TAKES UP TO 72 HRS FOR REFILLS; PT UNDERESTOOD

## 2020-04-23 DIAGNOSIS — F90.0 ATTENTION DEFICIT HYPERACTIVITY DISORDER (ADHD), PREDOMINANTLY INATTENTIVE TYPE: ICD-10-CM

## 2020-05-22 DIAGNOSIS — F90.0 ATTENTION DEFICIT HYPERACTIVITY DISORDER (ADHD), PREDOMINANTLY INATTENTIVE TYPE: ICD-10-CM

## 2020-06-22 DIAGNOSIS — F90.0 ATTENTION DEFICIT HYPERACTIVITY DISORDER (ADHD), PREDOMINANTLY INATTENTIVE TYPE: ICD-10-CM

## 2020-06-24 DIAGNOSIS — F90.0 ATTENTION DEFICIT HYPERACTIVITY DISORDER (ADHD), PREDOMINANTLY INATTENTIVE TYPE: ICD-10-CM

## 2020-07-22 ENCOUNTER — OFFICE VISIT (OUTPATIENT)
Dept: FAMILY MEDICINE CLINIC | Facility: CLINIC | Age: 25
End: 2020-07-22

## 2020-07-22 VITALS
HEIGHT: 64 IN | TEMPERATURE: 97.6 F | SYSTOLIC BLOOD PRESSURE: 98 MMHG | OXYGEN SATURATION: 99 % | BODY MASS INDEX: 16.9 KG/M2 | HEART RATE: 112 BPM | WEIGHT: 99 LBS | DIASTOLIC BLOOD PRESSURE: 60 MMHG | RESPIRATION RATE: 14 BRPM

## 2020-07-22 DIAGNOSIS — F90.0 ATTENTION DEFICIT HYPERACTIVITY DISORDER (ADHD), PREDOMINANTLY INATTENTIVE TYPE: Primary | ICD-10-CM

## 2020-07-22 PROCEDURE — 99213 OFFICE O/P EST LOW 20 MIN: CPT | Performed by: FAMILY MEDICINE

## 2020-07-22 RX ORDER — ATOMOXETINE 40 MG/1
40 CAPSULE ORAL DAILY
Qty: 30 CAPSULE | Refills: 0 | Status: SHIPPED | OUTPATIENT
Start: 2020-07-22

## 2020-07-22 RX ORDER — DROSPIRENONE AND ETHINYL ESTRADIOL 0.03MG-3MG
KIT ORAL
COMMUNITY
Start: 2020-05-20

## 2020-07-22 NOTE — PROGRESS NOTES
Subjective   Amanda Curtis is a 24 y.o. female.   Chief Complaint   Patient presents with   • 6mo f/u ADD     History of Present Illness   ADHD  Chronic condition, she is currently taking Vyvanse 60 mg daily. Needs medication to focus at work.   Under more stress at work due to COVID. Also, had miscarriage and has since started Starr to prevent pregnancy. Updated pap smear through gynecology.   Eating only 2 meals a day, or 1 meal and snacking. She is a vegetarian.   Denies palpitations, chest pain, sleep disturbance.  Failed Ritalin in the past.     The following portions of the patient's history were reviewed and updated as appropriate: allergies, current medications, past family history, past medical history, past social history, past surgical history and problem list.    Review of Systems   Constitutional: Negative for chills, fatigue and fever.   HENT: Negative.    Eyes: Negative.    Respiratory: Negative for cough, chest tightness and shortness of breath.    Cardiovascular: Negative for chest pain and palpitations.   Skin: Negative for rash.   Neurological: Negative for light-headedness, headache and confusion.   Hematological: Negative for adenopathy. Does not bruise/bleed easily.   Psychiatric/Behavioral: Negative.        Objective   Physical Exam   Constitutional: She appears well-developed.   thin   HENT:   Head: Normocephalic and atraumatic.   Right Ear: External ear normal.   Left Ear: External ear normal.   Nose: Nose normal.   Eyes: Conjunctivae are normal.   Cardiovascular: Normal rate, regular rhythm and normal heart sounds.   No murmur heard.  Pulmonary/Chest: Effort normal and breath sounds normal.   Neurological: She is alert.   Skin: Skin is warm and dry.   Psychiatric: Her behavior is normal. Her mood appears anxious.   Nursing note and vitals reviewed.        Assessment/Plan   Amanda was seen today for 6mo f/u add.    Diagnoses and all orders for this visit:    Attention deficit hyperactivity  disorder (ADHD), predominantly inattentive type  -     atomoxetine (Strattera) 40 MG capsule; Take 1 capsule by mouth Daily.    BMI less than 19,adult      With tachycardia and continued weight loss, will hold stimulant for now. She is not pleased with stopping Vyvanse. However, weight needs to improve, as Vyvanse is likely the main source of unintentional weight loss. She thinks that stress has lead to weight loss. Things are improving between job and relationship.  Treat with Strattera in the meantime, as she doesn't think she will be able to perform job duties without treatment. If continued weight loss, will have to hold treatment.   Tachycardia did improve after rest, HR 96 bmp.   Return in 1 month for weight recheck. Needs to increase overall daily caloric consumption. Recommended drinking protein shakes or Ensure for added calories.     UDS updated June 2020.

## 2023-12-16 NOTE — TELEPHONE ENCOUNTER
Regarding: Prescription Question  Contact: [phone]  ----- Message from Elham Winslow MA sent at 6/24/2020 10:11 AM EDT -----       ----- Message from Amanda Curtis to Franchesca May DO sent at 6/24/2020 10:05 AM -----   I requested a refill Monday (run out today.) It has not been called in yet! I have an appointment scheduled for July. Was not sure if there was another reason for a hold up.   No

## 2024-08-03 NOTE — TELEPHONE ENCOUNTER
----- Message from Chaya Sauceda sent at 10/24/2017 10:21 AM EDT -----  Contact: Lalo  Patient is needing a refill on Vyvanse 50mg. Please call patient when ready [phone].  
LM informing pt that Rx was ready for pu  
Refill printed. JONATHON  
SOTO Mercado
Dr. Stacy